# Patient Record
Sex: FEMALE | Race: WHITE | NOT HISPANIC OR LATINO
[De-identification: names, ages, dates, MRNs, and addresses within clinical notes are randomized per-mention and may not be internally consistent; named-entity substitution may affect disease eponyms.]

---

## 2019-11-08 ENCOUNTER — APPOINTMENT (OUTPATIENT)
Dept: UROGYNECOLOGY | Facility: CLINIC | Age: 29
End: 2019-11-08
Payer: COMMERCIAL

## 2019-11-08 VITALS
HEART RATE: 75 BPM | WEIGHT: 165 LBS | HEIGHT: 61 IN | SYSTOLIC BLOOD PRESSURE: 117 MMHG | DIASTOLIC BLOOD PRESSURE: 76 MMHG | BODY MASS INDEX: 31.15 KG/M2

## 2019-11-08 DIAGNOSIS — Z78.9 OTHER SPECIFIED HEALTH STATUS: ICD-10-CM

## 2019-11-08 DIAGNOSIS — Z83.49 FAMILY HISTORY OF OTHER ENDOCRINE, NUTRITIONAL AND METABOLIC DISEASES: ICD-10-CM

## 2019-11-08 DIAGNOSIS — Z83.3 FAMILY HISTORY OF DIABETES MELLITUS: ICD-10-CM

## 2019-11-08 DIAGNOSIS — Z82.49 FAMILY HISTORY OF ISCHEMIC HEART DISEASE AND OTHER DISEASES OF THE CIRCULATORY SYSTEM: ICD-10-CM

## 2019-11-08 DIAGNOSIS — Z87.19 PERSONAL HISTORY OF OTHER DISEASES OF THE DIGESTIVE SYSTEM: ICD-10-CM

## 2019-11-08 DIAGNOSIS — Z83.438 FAMILY HISTORY OF OTHER DISORDER OF LIPOPROTEIN METABOLISM AND OTHER LIPIDEMIA: ICD-10-CM

## 2019-11-08 DIAGNOSIS — M62.89 OTHER SPECIFIED DISORDERS OF MUSCLE: ICD-10-CM

## 2019-11-08 PROBLEM — Z00.00 ENCOUNTER FOR PREVENTIVE HEALTH EXAMINATION: Status: ACTIVE | Noted: 2019-11-08

## 2019-11-08 PROCEDURE — 99204 OFFICE O/P NEW MOD 45 MIN: CPT

## 2019-11-08 RX ORDER — NORETHINDRONE ACETATE AND ETHINYL ESTRADIOL 1.5-30(21)
KIT ORAL
Refills: 0 | Status: ACTIVE | COMMUNITY

## 2019-11-08 RX ORDER — DIAZEPAM 5 MG/1
TABLET ORAL
Refills: 0 | Status: ACTIVE | COMMUNITY

## 2019-11-08 RX ORDER — INFLIXIMAB 100 MG/10ML
INJECTION, POWDER, LYOPHILIZED, FOR SOLUTION INTRAVENOUS
Refills: 0 | Status: ACTIVE | COMMUNITY

## 2019-11-08 RX ORDER — MESALAMINE 1000 MG/1
SUPPOSITORY RECTAL
Refills: 0 | Status: ACTIVE | COMMUNITY

## 2019-11-08 RX ORDER — AMITRIPTYLINE HYDROCHLORIDE 75 MG/1
TABLET, FILM COATED ORAL
Refills: 0 | Status: ACTIVE | COMMUNITY

## 2019-11-08 NOTE — CHIEF COMPLAINT
[Questionnaire Received] : Patient questionnaire received [Sexual Dysfunction] : sexual dysfunction [Painful Urination] : painful urination [Pelvic Pain] : pelvic pain

## 2019-11-08 NOTE — LETTER BODY
[Attached please find my note.] : Attached please find my note. [Thank you very much for allowing me to participate in the care of this patient. If you have any questions, please do not hesitate to contact me] : Thank you very much for allowing me to participate in the care of this patient. If you have any questions, please do not hesitate to contact me. [Dear  ___] : Dear  [unfilled],

## 2019-11-11 RX ORDER — BACLOFEN 5 MG/5ML
5 SOLUTION ORAL
Qty: 473 | Refills: 1 | Status: DISCONTINUED | COMMUNITY
Start: 2019-11-08 | End: 2019-11-11

## 2019-11-15 ENCOUNTER — MESSAGE (OUTPATIENT)
Age: 29
End: 2019-11-15

## 2019-12-17 ENCOUNTER — RESULT REVIEW (OUTPATIENT)
Age: 29
End: 2019-12-17

## 2019-12-17 RX ORDER — BACLOFEN 5 MG/1
5 TABLET ORAL
Qty: 90 | Refills: 0 | Status: ACTIVE | COMMUNITY
Start: 2019-11-11 | End: 1900-01-01

## 2019-12-19 ENCOUNTER — MEDICATION RENEWAL (OUTPATIENT)
Age: 29
End: 2019-12-19

## 2019-12-20 ENCOUNTER — MESSAGE (OUTPATIENT)
Age: 29
End: 2019-12-20

## 2019-12-20 ENCOUNTER — RX RENEWAL (OUTPATIENT)
Age: 29
End: 2019-12-20

## 2019-12-20 ENCOUNTER — MEDICATION RENEWAL (OUTPATIENT)
Age: 29
End: 2019-12-20

## 2020-01-10 ENCOUNTER — OUTPATIENT (OUTPATIENT)
Dept: OUTPATIENT SERVICES | Facility: HOSPITAL | Age: 30
LOS: 1 days | End: 2020-01-10
Payer: COMMERCIAL

## 2020-01-10 ENCOUNTER — APPOINTMENT (OUTPATIENT)
Dept: UROGYNECOLOGY | Facility: CLINIC | Age: 30
End: 2020-01-10
Payer: COMMERCIAL

## 2020-01-10 VITALS
OXYGEN SATURATION: 100 % | HEIGHT: 61 IN | SYSTOLIC BLOOD PRESSURE: 108 MMHG | RESPIRATION RATE: 20 BRPM | DIASTOLIC BLOOD PRESSURE: 76 MMHG | WEIGHT: 166.01 LBS | HEART RATE: 87 BPM | TEMPERATURE: 99 F

## 2020-01-10 DIAGNOSIS — N89.6 TIGHT HYMENAL RING: ICD-10-CM

## 2020-01-10 DIAGNOSIS — Z87.19 PERSONAL HISTORY OF OTHER DISEASES OF THE DIGESTIVE SYSTEM: ICD-10-CM

## 2020-01-10 DIAGNOSIS — N94.2 VAGINISMUS: ICD-10-CM

## 2020-01-10 DIAGNOSIS — N94.19 OTHER SPECIFIED DYSPAREUNIA: ICD-10-CM

## 2020-01-10 DIAGNOSIS — R10.2 PELVIC AND PERINEAL PAIN: ICD-10-CM

## 2020-01-10 DIAGNOSIS — M62.838 OTHER MUSCLE SPASM: ICD-10-CM

## 2020-01-10 DIAGNOSIS — Z87.42 PERSONAL HISTORY OF OTHER DISEASES OF THE FEMALE GENITAL TRACT: ICD-10-CM

## 2020-01-10 DIAGNOSIS — D50.8 OTHER IRON DEFICIENCY ANEMIAS: ICD-10-CM

## 2020-01-10 DIAGNOSIS — Z98.890 OTHER SPECIFIED POSTPROCEDURAL STATES: Chronic | ICD-10-CM

## 2020-01-10 DIAGNOSIS — Z01.818 ENCOUNTER FOR OTHER PREPROCEDURAL EXAMINATION: ICD-10-CM

## 2020-01-10 LAB
ANION GAP SERPL CALC-SCNC: 11 MMOL/L — SIGNIFICANT CHANGE UP (ref 5–17)
BUN SERPL-MCNC: 10 MG/DL — SIGNIFICANT CHANGE UP (ref 7–23)
CALCIUM SERPL-MCNC: 9.4 MG/DL — SIGNIFICANT CHANGE UP (ref 8.4–10.5)
CHLORIDE SERPL-SCNC: 103 MMOL/L — SIGNIFICANT CHANGE UP (ref 96–108)
CO2 SERPL-SCNC: 24 MMOL/L — SIGNIFICANT CHANGE UP (ref 22–31)
CREAT SERPL-MCNC: 0.72 MG/DL — SIGNIFICANT CHANGE UP (ref 0.5–1.3)
GLUCOSE SERPL-MCNC: 119 MG/DL — HIGH (ref 70–99)
HCT VFR BLD CALC: 43.5 % — SIGNIFICANT CHANGE UP (ref 34.5–45)
HGB BLD-MCNC: 14.2 G/DL — SIGNIFICANT CHANGE UP (ref 11.5–15.5)
MCHC RBC-ENTMCNC: 30.1 PG — SIGNIFICANT CHANGE UP (ref 27–34)
MCHC RBC-ENTMCNC: 32.6 GM/DL — SIGNIFICANT CHANGE UP (ref 32–36)
MCV RBC AUTO: 92.2 FL — SIGNIFICANT CHANGE UP (ref 80–100)
PLATELET # BLD AUTO: 325 K/UL — SIGNIFICANT CHANGE UP (ref 150–400)
POTASSIUM SERPL-MCNC: 3.8 MMOL/L — SIGNIFICANT CHANGE UP (ref 3.5–5.3)
POTASSIUM SERPL-SCNC: 3.8 MMOL/L — SIGNIFICANT CHANGE UP (ref 3.5–5.3)
RBC # BLD: 4.72 M/UL — SIGNIFICANT CHANGE UP (ref 3.8–5.2)
RBC # FLD: 12.4 % — SIGNIFICANT CHANGE UP (ref 10.3–14.5)
SODIUM SERPL-SCNC: 138 MMOL/L — SIGNIFICANT CHANGE UP (ref 135–145)
WBC # BLD: 8.61 K/UL — SIGNIFICANT CHANGE UP (ref 3.8–10.5)
WBC # FLD AUTO: 8.61 K/UL — SIGNIFICANT CHANGE UP (ref 3.8–10.5)

## 2020-01-10 PROCEDURE — 99214 OFFICE O/P EST MOD 30 MIN: CPT

## 2020-01-10 PROCEDURE — 85027 COMPLETE CBC AUTOMATED: CPT

## 2020-01-10 PROCEDURE — G0463: CPT

## 2020-01-10 PROCEDURE — 80048 BASIC METABOLIC PNL TOTAL CA: CPT

## 2020-01-10 RX ORDER — SODIUM CHLORIDE 9 MG/ML
3 INJECTION INTRAMUSCULAR; INTRAVENOUS; SUBCUTANEOUS EVERY 8 HOURS
Refills: 0 | Status: DISCONTINUED | OUTPATIENT
Start: 2020-01-22 | End: 2020-02-06

## 2020-01-10 RX ORDER — CEFAZOLIN SODIUM 1 G
2000 VIAL (EA) INJECTION ONCE
Refills: 0 | Status: DISCONTINUED | OUTPATIENT
Start: 2020-01-22 | End: 2020-02-06

## 2020-01-10 RX ORDER — LIDOCAINE HCL 20 MG/ML
0.2 VIAL (ML) INJECTION ONCE
Refills: 0 | Status: DISCONTINUED | OUTPATIENT
Start: 2020-01-22 | End: 2020-02-06

## 2020-01-10 NOTE — H&P PST ADULT - NSICDXPASTMEDICALHX_GEN_ALL_CORE_FT
PAST MEDICAL HISTORY:  H/O female dyspareunia     H/O ulcerative colitis     Irritable bowel syndrome (IBS)     Other iron deficiency anemia

## 2020-01-10 NOTE — H&P PST ADULT - NSICDXFAMILYHX_GEN_ALL_CORE_FT
FAMILY HISTORY:  Family hx of hypertension, mother age 65 alive  FHx: diabetes mellitus, father age 59 alive

## 2020-01-10 NOTE — H&P PST ADULT - HISTORY OF PRESENT ILLNESS
29 year old female with h/o dyspareunia/ tight hymenal ring is scheduled on 1/22/2020 for dilation of vagina under anesthesia, injection of anesthetic agent into pudendal nerve, partial hymenectomy, cystoscopy, also with h/o ulcerative colitis, IBS-D.  Patient had straight cath urine collected at Dr Pak office 1/10/2020.

## 2020-01-10 NOTE — H&P PST ADULT - NSICDXPROBLEM_GEN_ALL_CORE_FT
PROBLEM DIAGNOSES  Problem: H/O female dyspareunia  Assessment and Plan: scheduled dilation of vagina under anesthesia, injection of anesthic agent into pudendal nerve, partial hymenectomy, trigger point injection, cystoscopy.  preop instruction given to both patient and her significant other, both verbalized understanding   urine cup given for patient to bring urine sample for pregnancy DOS    Problem: H/O ulcerative colitis  Assessment and Plan: patient will continue her medication michael-op    Problem: Other iron deficiency anemia  Assessment and Plan:

## 2020-01-10 NOTE — H&P PST ADULT - NSANTHOSAYNRD_GEN_A_CORE
No. MATRITA screening performed.  STOP BANG Legend: 0-2 = LOW Risk; 3-4 = INTERMEDIATE Risk; 5-8 = HIGH Risk

## 2020-01-13 ENCOUNTER — RESULT REVIEW (OUTPATIENT)
Age: 30
End: 2020-01-13

## 2020-01-13 LAB — BACTERIA UR CULT: NORMAL

## 2020-01-21 ENCOUNTER — TRANSCRIPTION ENCOUNTER (OUTPATIENT)
Age: 30
End: 2020-01-21

## 2020-01-21 NOTE — HISTORY OF PRESENT ILLNESS
[] : years ago [Cystocele (Obstetric)] : daily [Rectal Prolapse] : none [FreeTextEntry1] : \par 30yo G0 with pelvic floor dysfunction and pelvic. She has tried pelvic floor PT, vaginal dilators and is currently on Amitriptyline and Diazepam. She always had painful intercourse and was diagnosed vulvodynia. PFT and vaginal dilators for a year. She reports these interventions helped somewhat, however, she continued to have difficulty with intercourse. She just started Ambidextrin and had extremely minimal improvement.\par \par She has a teen history for a trampoline injury to coccyx - was not able to walk for several hours and had weeks to months of pain. \par \par Sex only with her  - always uncomfortable - barely can have penetration and feels severe burning and tensioning of muscles. Able to enjoy external stimulation, oral stimulation, anal sex. \par \par PMH: Ulcerative colitis\par \par \par Review of system negative except as indicated on review of systems\par \par \par Constitutional:  No acute distress, well developed well nourished good hygiene\par \par Neuro/psych:  Orientated x2,   normal memory\par \par Respiratory:   no cough, no dypsnea\par \par Neck:    Normal appearance,   symmetrical\par \par Abdomen: No masses,   tenderness,  distension,  hernia\par \par Occult blood: Not performed\par \par Skin warm and dry,  No rash,  No lesions\par \par Musculoskeletal: l Normal gait, No involuntary movements\par \par External Genitalia: normal.  I do not appreciate vulvodynia - she has guarding in anticipation of internal exam. \par \par Labia/Clitoris: Labia normal, clitoris normal\par \par Urethra: normal\par \par Vagina: severe stenosis with global severe muscle spasm 5/5. The pelvic exam is significant for vaginal constriction there is marked at muscle spasm bilateral. There is spasm & tightness of the iliococcygeus muscle, the pubococcygeus muscle, the puborectalis, and piriformis muscles bilaterally.  Myofascial pain is elicited easily and is moderate to severe with examination or palpation of each muscle group. The spasm id greater on the left than the right.  There is tight banding on the left which has focal pain. There is spasm response to palpation or 'rolling' of the examining finger in this region and over most of the taught muscles.   These finding represent severe myofascial spasm, pain, and trigger points are noted at each location.   It is my impression that muscle spasm accounts for this patients pain / dyspareunia.  These findings commonly cause or exacerbate pelvic pain, vaginal pain, dyspareunia, vaginismus, voiding function, and pelvic floor dysfunction.\par \par Bladder: No tenderness/masses\par \par Vaginal Atrophy:  mild\par \par Cervix & Uterus: cervix palpable normal - limited exam digital only\par \par Adnexa: No abnormality\par \par Rectal Mucosa: normal\par \par Abdomen: No mass, tenderness, hernia\par \par \par Prolapse Assessment: no evidence of prolapse. \par \par Levators: 5/5 global with pain. \par \par \par A/P:\par \par This hamlet 29-year-old has pelvic floor dysfunction manifest with vaginismus. She had an initial coccyx injury as a teen and has never been able to have comfortable sexual relations. She is able to enjoy self stimulation oral stimulation and anal 6 anything in the vagina is met with significant pain. The vagina is stenotic with 5 out of 5 for muscular spasm globally she has worked with physical therapist and multiple dilators and has minimal to no progress. She has been on antidepressant for pain which gives her a trace relief of the vulva I do not think she has vulvodynia.\par \par I have counseled her regarding the spectrum of the anatomy and treatment plans for pelvic muscle spasm recommendations. She'll begin hot baths heating pads direct massage relaxation techniques foam roller. I do not think to benefit from pelvic floor physical therapy as she is presently too reactive and intolerant of touch.  She will begin a liquid form of baclofen ( not good with tablets) for muscle relaxation and vaginal diazepam. The off label risks of vaginal diazepam were reviewed and the sedating effect of each medication were reviewed. She will start was one and then had the other and gradually increasing doses. He scheduled for baclofen was given to her. The vaginal diazepam will start with 5 mg daily increasing to 10 mg daily.\par \par If the liquid form of baclofen is not covered which is highly likely and we will switch her to oral tablets her she will have to crush and mixed with liquid and her foods as she cannot swallow tablets.\par \par She will call to update me in 2 weeks on her progress. She will abstain from sexual intercourse in the interim. She will practice dilating only with her finger one finger then 2 fingers as she is inserting the vaginal diazepam.\par \par \par I did discuss with her vaginal dilation under anesthesia with the without the use of Botox and ultimately do think she would benefit from that procedure.\par \par Counselingis greater than 50% of this encounter which was 65 minutes face to face. \par \par \par \par \par

## 2020-01-22 ENCOUNTER — APPOINTMENT (OUTPATIENT)
Dept: UROGYNECOLOGY | Facility: HOSPITAL | Age: 30
End: 2020-01-22

## 2020-01-22 ENCOUNTER — OUTPATIENT (OUTPATIENT)
Dept: OUTPATIENT SERVICES | Facility: HOSPITAL | Age: 30
LOS: 1 days | Discharge: HOME | End: 2020-01-22
Payer: COMMERCIAL

## 2020-01-22 VITALS
HEART RATE: 86 BPM | OXYGEN SATURATION: 99 % | DIASTOLIC BLOOD PRESSURE: 63 MMHG | RESPIRATION RATE: 16 BRPM | TEMPERATURE: 99 F | SYSTOLIC BLOOD PRESSURE: 111 MMHG

## 2020-01-22 VITALS
DIASTOLIC BLOOD PRESSURE: 78 MMHG | SYSTOLIC BLOOD PRESSURE: 113 MMHG | TEMPERATURE: 97 F | RESPIRATION RATE: 18 BRPM | HEART RATE: 76 BPM | HEIGHT: 61 IN | OXYGEN SATURATION: 100 % | WEIGHT: 166.01 LBS

## 2020-01-22 DIAGNOSIS — Z98.890 OTHER SPECIFIED POSTPROCEDURAL STATES: Chronic | ICD-10-CM

## 2020-01-22 DIAGNOSIS — N94.19 OTHER SPECIFIED DYSPAREUNIA: ICD-10-CM

## 2020-01-22 DIAGNOSIS — N89.6 TIGHT HYMENAL RING: ICD-10-CM

## 2020-01-22 DIAGNOSIS — M62.838 OTHER MUSCLE SPASM: ICD-10-CM

## 2020-01-22 DIAGNOSIS — R10.2 PELVIC AND PERINEAL PAIN: ICD-10-CM

## 2020-01-22 DIAGNOSIS — N94.2 VAGINISMUS: ICD-10-CM

## 2020-01-22 PROCEDURE — 57400 DILATION OF VAGINA: CPT

## 2020-01-22 PROCEDURE — 56700 PRTL HYMNCTMY/REVJ HYMNL RNG: CPT

## 2020-01-22 PROCEDURE — 53660 DILATION OF URETHRA: CPT

## 2020-01-22 PROCEDURE — J0585H: CUSTOM

## 2020-01-22 PROCEDURE — 20552 NJX 1/MLT TRIGGER POINT 1/2: CPT | Mod: XS

## 2020-01-22 PROCEDURE — 20553 NJX 1/MLT TRIGGER POINTS 3/>: CPT

## 2020-01-22 PROCEDURE — 64646 CHEMODENERV TRUNK MUSC 1-5: CPT

## 2020-01-22 RX ORDER — SODIUM CHLORIDE 9 MG/ML
1000 INJECTION, SOLUTION INTRAVENOUS
Refills: 0 | Status: DISCONTINUED | OUTPATIENT
Start: 2020-01-22 | End: 2020-02-06

## 2020-01-22 RX ORDER — OXYCODONE HYDROCHLORIDE 5 MG/1
5 TABLET ORAL
Qty: 50 | Refills: 0
Start: 2020-01-22 | End: 2020-01-24

## 2020-01-22 RX ORDER — FAMOTIDINE 10 MG/ML
20 INJECTION INTRAVENOUS ONCE
Refills: 0 | Status: COMPLETED | OUTPATIENT
Start: 2020-01-22 | End: 2020-01-22

## 2020-01-22 RX ORDER — ACETAMINOPHEN 500 MG
20.3 TABLET ORAL
Qty: 203 | Refills: 0
Start: 2020-01-22 | End: 2020-01-24

## 2020-01-22 RX ORDER — LIDOCAINE 4 G/100G
5 CREAM TOPICAL
Qty: 100 | Refills: 0
Start: 2020-01-22 | End: 2020-01-28

## 2020-01-22 RX ORDER — ONDANSETRON 8 MG/1
4 TABLET, FILM COATED ORAL ONCE
Refills: 0 | Status: DISCONTINUED | OUTPATIENT
Start: 2020-01-22 | End: 2020-02-06

## 2020-01-22 RX ORDER — OXYCODONE HYDROCHLORIDE 5 MG/1
1 TABLET ORAL
Qty: 10 | Refills: 0
Start: 2020-01-22 | End: 2020-01-24

## 2020-01-22 RX ORDER — ACETAMINOPHEN 500 MG
2 TABLET ORAL
Qty: 0 | Refills: 0 | DISCHARGE

## 2020-01-22 RX ORDER — OXYCODONE HYDROCHLORIDE 5 MG/1
5 TABLET ORAL ONCE
Refills: 0 | Status: DISCONTINUED | OUTPATIENT
Start: 2020-01-22 | End: 2020-01-22

## 2020-01-22 NOTE — BRIEF OPERATIVE NOTE - NSICDXBRIEFPREOP_GEN_ALL_CORE_FT
PRE-OP DIAGNOSIS:  Vaginismus 22-Jan-2020 15:39:10  Dayanna Kenny  Urethral meatal stenosis 22-Jan-2020 15:39:00  Dayanna Kenny  Tight hymenal ring 22-Jan-2020 15:38:53  Dayanna Kenny

## 2020-01-22 NOTE — PRE-ANESTHESIA EVALUATION ADULT - NSANTHOSAYNRD_GEN_A_CORE
No. MARTITA screening performed.  STOP BANG Legend: 0-2 = LOW Risk; 3-4 = INTERMEDIATE Risk; 5-8 = HIGH Risk

## 2020-01-22 NOTE — ASU PATIENT PROFILE, ADULT - VISION (WITH CORRECTIVE LENSES IF THE PATIENT USUALLY WEARS THEM):
glasses glasses/Normal vision: sees adequately in most situations; can see medication labels, newsprint

## 2020-01-22 NOTE — ASU PATIENT PROFILE, ADULT - PMH
H/O female dyspareunia    H/O ulcerative colitis    Irritable bowel syndrome (IBS)    Other iron deficiency anemia

## 2020-01-22 NOTE — BRIEF OPERATIVE NOTE - NSICDXBRIEFPROCEDURE_GEN_ALL_CORE_FT
PROCEDURES:  Female urethral dilation 22-Jan-2020 15:38:29  Dayanna Kenny  Trigger point injection of 3 muscles 22-Jan-2020 15:38:21  Dayanna Kenny  Pudendal nerve block 22-Jan-2020 15:37:46  Dayanna Kenny  Hymenotomy 22-Jan-2020 15:37:39  Dayanna Kenny

## 2020-01-22 NOTE — ASU DISCHARGE PLAN (ADULT/PEDIATRIC) - CALL YOUR DOCTOR IF YOU HAVE ANY OF THE FOLLOWING:
Increased irritability or sluggishness/Nausea and vomiting that does not stop/Inability to tolerate liquids or foods/Wound/Surgical Site with redness, or foul smelling discharge or pus/Numbness, tingling, color or temperature change to extremity/Fever greater than (need to indicate Fahrenheit or Celsius)/Bleeding that does not stop/Unable to urinate/Excessive diarrhea/Swelling that gets worse/Pain not relieved by Medications

## 2020-01-22 NOTE — BRIEF OPERATIVE NOTE - NSICDXBRIEFPOSTOP_GEN_ALL_CORE_FT
POST-OP DIAGNOSIS:  Vaginismus 22-Jan-2020 15:39:39  Dayanna Kenny  Urethral meatal stenosis 22-Jan-2020 15:39:29  Dayanna Kenny  Tight hymenal ring 22-Jan-2020 15:39:22  Dayanna Kenny

## 2020-01-22 NOTE — ASU DISCHARGE PLAN (ADULT/PEDIATRIC) - CARE PROVIDER_API CALL
Jayden Pak (MD)  Female Pelvic MedReconst Surg; Obstetrics and Gynecology  [address]  [place] [address]  Phone: [phone]  Fax: [phone]  Follow Up Time:

## 2020-01-22 NOTE — BRIEF OPERATIVE NOTE - OPERATION/FINDINGS
Narrow introitus with intact hymen, urethral meatal stenosis.  Pelvic floor muscle hypertrophy Following dilation urethral normal caliber and vagina able to accommodate largest glass vaginal dilator.

## 2020-01-23 ENCOUNTER — APPOINTMENT (OUTPATIENT)
Dept: UROGYNECOLOGY | Facility: CLINIC | Age: 30
End: 2020-01-23
Payer: COMMERCIAL

## 2020-01-23 PROBLEM — K58.9 IRRITABLE BOWEL SYNDROME WITHOUT DIARRHEA: Chronic | Status: ACTIVE | Noted: 2020-01-10

## 2020-01-23 PROBLEM — Z87.19 PERSONAL HISTORY OF OTHER DISEASES OF THE DIGESTIVE SYSTEM: Chronic | Status: ACTIVE | Noted: 2020-01-10

## 2020-01-23 PROBLEM — D50.8 OTHER IRON DEFICIENCY ANEMIAS: Chronic | Status: ACTIVE | Noted: 2020-01-10

## 2020-01-23 PROBLEM — Z87.42 PERSONAL HISTORY OF OTHER DISEASES OF THE FEMALE GENITAL TRACT: Chronic | Status: ACTIVE | Noted: 2020-01-10

## 2020-01-23 PROCEDURE — 99024 POSTOP FOLLOW-UP VISIT: CPT

## 2020-01-27 NOTE — END OF VISIT
[FreeTextEntry3] : I have discussed the case with practitioner and directly  supervised face to face the key elements of the encounter\par

## 2020-01-27 NOTE — OBJECTIVE
[FreeTextEntry3] : able to insert medium glass dilator at visit, with no difficulty or pain.  Large glass dilator inserted alf but pt reported soreness with advancement.  PFM's relaxed.

## 2020-01-27 NOTE — SUBJECTIVE
[FreeTextEntry1] : ok [FreeTextEntry7] : relieved with oxycodone [FreeTextEntry6] : normal [FreeTextEntry5] : denies any retention, frequency or incontinence [FreeTextEntry4] : s [FreeTextEntry3] : normal [FreeTextEntry2] : normal

## 2020-01-27 NOTE — DISCUSSION/SUMMARY
[FreeTextEntry1] : Pt doing well after procedure. She was able to sleep with dilator in place since yesterday.    She reported some soreness which was relieved with oxycodone.  Pt is not able to take NSAIDS.  She also has liquid Tylenol.  Pt advised to continue using medium glass dilator or Large sy[place]use dilator for 1-2 hrs daily.   She is aware that the main source of discomfort would be due to hymenotomy. Pt is very happy with her progress. She will RTO in 2 weeks or sooner if needed. Pt agrees to call office with any problems/concerns.

## 2020-02-04 RX ORDER — LIDOCAINE 5 G/100G
5 OINTMENT TOPICAL
Qty: 1 | Refills: 3 | Status: DISCONTINUED | COMMUNITY
Start: 2020-02-03 | End: 2020-02-04

## 2020-02-04 RX ORDER — OXYCODONE 5 MG/1
5 TABLET ORAL
Qty: 30 | Refills: 0 | Status: DISCONTINUED | COMMUNITY
Start: 2020-02-03 | End: 2020-02-04

## 2020-02-04 RX ORDER — OXYCODONE HYDROCHLORIDE 5 MG/5ML
5 SOLUTION ORAL
Qty: 200 | Refills: 0 | Status: ACTIVE | COMMUNITY
Start: 2020-02-04 | End: 1900-01-01

## 2020-02-10 ENCOUNTER — APPOINTMENT (OUTPATIENT)
Dept: UROGYNECOLOGY | Facility: CLINIC | Age: 30
End: 2020-02-10
Payer: COMMERCIAL

## 2020-02-10 DIAGNOSIS — N94.9 UNSPECIFIED CONDITION ASSOCIATED WITH FEMALE GENITAL ORGANS AND MENSTRUAL CYCLE: ICD-10-CM

## 2020-02-10 LAB
BILIRUB UR QL STRIP: NORMAL
CLARITY UR: CLEAR
COLLECTION METHOD: NORMAL
GLUCOSE UR-MCNC: NORMAL
HCG UR QL: 0.2 EU/DL
HGB UR QL STRIP.AUTO: NORMAL
KETONES UR-MCNC: NORMAL
LEUKOCYTE ESTERASE UR QL STRIP: NORMAL
NITRITE UR QL STRIP: NORMAL
PH UR STRIP: 6
PROT UR STRIP-MCNC: NORMAL
SP GR UR STRIP: 1.01

## 2020-02-10 PROCEDURE — 99024 POSTOP FOLLOW-UP VISIT: CPT

## 2020-02-10 PROCEDURE — 99213 OFFICE O/P EST LOW 20 MIN: CPT

## 2020-02-10 RX ORDER — ACETAMINOPHEN 160 MG/5ML
160 LIQUID ORAL
Qty: 1 | Refills: 0 | Status: ACTIVE | COMMUNITY
Start: 2020-02-10 | End: 1900-01-01

## 2020-02-10 NOTE — OBJECTIVE
[Soft and Nontender] : soft and nontender [Good Support] : Good support [Clean, Dry, Intact] : Clean, Dry, Intact [Healing well] : healing well [No Masses or Tenderness] : no masses or tenderness [FreeTextEntry3] : noticed some banding upon entry but once passed, she is more comfortable.

## 2020-02-10 NOTE — DISCUSSION/SUMMARY
[Post-Op instructions given. Pt/family verbalizes understanding] : post-operative instructions were provided to the patient/family who verbalize understanding [Risks/Benefits discussed. Pt/family verbalizes understanding] : risks and benefits of the procedure were discussed with the patient/family who verbalize understanding [FreeTextEntry1] : Dr. Pak discussed with pt to only stick with the glass medium dilator for now and allow the hymenotomy to heal.  She will continue the regimen and not advance to the Large dilator.  Boyfriend will help to connect with pt other than the therapy portion.  \par She will return in two weeks to see if able to advance to the large dilator and area is healed doing better.  IF pt have any problems/concern to call office.  PT and boyfriend aware and agrees.

## 2020-02-10 NOTE — SUBJECTIVE
[FreeTextEntry1] : PT is healing since surgery.  Been using the medium glass dilator for 1/2 hour and then the [place] dilator Large 1 /12 hour.  She's been having the burning sensation upon entry but once she advances the dilator, is fine.  She feels some burning after urination as well.  She is unsure if it's her incisions that she feels this burning.  Stated she has had this prior to surgery.  [FreeTextEntry8] : Oxycodone as needed but have not been using it.  Tylenol as needed.  Lidocaine. [FreeTextEntry7] : Burning and discomfort upon entry otherwise she is much better.  In the past pain, was much higher, she is now 6/10. [FreeTextEntry6] : good [FreeTextEntry5] : unsure if she has an infection.  Was never test by her GYN.  Urine dip today was negative. [FreeTextEntry2] : good [FreeTextEntry4] : good [FreeTextEntry3] : normal and steady

## 2020-02-24 ENCOUNTER — APPOINTMENT (OUTPATIENT)
Dept: UROGYNECOLOGY | Facility: CLINIC | Age: 30
End: 2020-02-24
Payer: COMMERCIAL

## 2020-02-24 DIAGNOSIS — Z98.890 OTHER SPECIFIED POSTPROCEDURAL STATES: ICD-10-CM

## 2020-02-24 PROCEDURE — 99212 OFFICE O/P EST SF 10 MIN: CPT

## 2020-02-24 NOTE — DISCUSSION/SUMMARY
[Risks/Benefits discussed. Pt/family verbalizes understanding] : risks and benefits of the procedure were discussed with the patient/family who verbalize understanding [Post-Op instructions given. Pt/family verbalizes understanding] : post-operative instructions were provided to the patient/family who verbalize understanding [FreeTextEntry1] : Pt to only stick with the glass medium dilator for now and apply good amount of lubrication to help glide dilator in.  She will continue the regimen and not advance to the large dilator when she is ready.  She will call office to provide progress.   will help to connect with pt other than the therapy portion.  Pt is more comfortable with anatomy and with touch.\par She will return in 4 weeks to see if able to advance to the large dilator and area is doing better.  IF pt have any problems/concern to call office.  PT and  aware and agrees.

## 2020-02-24 NOTE — OBJECTIVE
[Clean, Dry, Intact] : Clean, Dry, Intact [Soft and Nontender] : soft and nontender [Good Support] : Good support [No Masses or Tenderness] : no masses or tenderness [Healing well] : healing well [FreeTextEntry3] : Noticed some mild erythematous irritation of the left labial minora.  Pt able to insert Medium glass dilator with breathing techniques more comfortably.   was able to help with process.

## 2020-02-24 NOTE — SUBJECTIVE
[FreeTextEntry1] : PT is healing since surgery.  Been using the small glass dilator pushing down on the fourchette and she's been doing well with it.  She had tried to go back on the medium glass dilator and feels a raw, burning sensation now on the left side.  Once she's passed the area, she is fine having the dilator inside. [FreeTextEntry8] : No longer using the the Oxycodone or Tylenol.  She is using the Lidocaine. [FreeTextEntry7] : Some burning along the labial on the left. [FreeTextEntry6] : good [FreeTextEntry5] : urinating fine. [FreeTextEntry4] : good [FreeTextEntry3] : normal and steady [FreeTextEntry2] : good

## 2020-03-30 ENCOUNTER — APPOINTMENT (OUTPATIENT)
Dept: UROGYNECOLOGY | Facility: CLINIC | Age: 30
End: 2020-03-30

## 2020-09-22 ENCOUNTER — RX RENEWAL (OUTPATIENT)
Age: 30
End: 2020-09-22

## 2020-09-25 ENCOUNTER — RX RENEWAL (OUTPATIENT)
Age: 30
End: 2020-09-25

## 2020-09-25 RX ORDER — LIDOCAINE HYDROCHLORIDE 20 MG/ML
2 SOLUTION ORAL; TOPICAL
Qty: 100 | Refills: 0 | Status: ACTIVE | COMMUNITY
Start: 2020-02-04 | End: 1900-01-01

## 2021-02-04 ENCOUNTER — NON-APPOINTMENT (OUTPATIENT)
Age: 31
End: 2021-02-04

## 2021-02-05 ENCOUNTER — TRANSCRIPTION ENCOUNTER (OUTPATIENT)
Age: 31
End: 2021-02-05

## 2021-02-05 ENCOUNTER — APPOINTMENT (OUTPATIENT)
Dept: OTOLARYNGOLOGY | Facility: CLINIC | Age: 31
End: 2021-02-05
Payer: COMMERCIAL

## 2021-02-05 VITALS — BODY MASS INDEX: 31.72 KG/M2 | HEIGHT: 61 IN | TEMPERATURE: 98.9 F | WEIGHT: 168 LBS

## 2021-02-05 DIAGNOSIS — J31.0 CHRONIC RHINITIS: ICD-10-CM

## 2021-02-05 DIAGNOSIS — R43.1 PAROSMIA: ICD-10-CM

## 2021-02-05 DIAGNOSIS — Z86.19 PERSONAL HISTORY OF OTHER INFECTIOUS AND PARASITIC DISEASES: ICD-10-CM

## 2021-02-05 DIAGNOSIS — M26.609 UNSPECIFIED TEMPOROMANDIBULAR JOINT DISORDER: ICD-10-CM

## 2021-02-05 DIAGNOSIS — R43.8 OTHER DISTURBANCES OF SMELL AND TASTE: ICD-10-CM

## 2021-02-05 DIAGNOSIS — R43.2 PARAGEUSIA: ICD-10-CM

## 2021-02-05 DIAGNOSIS — J34.2 DEVIATED NASAL SEPTUM: ICD-10-CM

## 2021-02-05 PROCEDURE — 31231 NASAL ENDOSCOPY DX: CPT

## 2021-02-05 PROCEDURE — 99072 ADDL SUPL MATRL&STAF TM PHE: CPT

## 2021-02-05 PROCEDURE — 99244 OFF/OP CNSLTJ NEW/EST MOD 40: CPT | Mod: 25

## 2021-02-05 PROCEDURE — 92512 NASAL FUNCTION STUDIES: CPT

## 2021-02-05 PROCEDURE — 99204 OFFICE O/P NEW MOD 45 MIN: CPT

## 2021-02-05 RX ORDER — IVERMECTIN 3 MG/1
3 TABLET ORAL
Qty: 16 | Refills: 0 | Status: ACTIVE | COMMUNITY
Start: 2021-02-05 | End: 1900-01-01

## 2021-02-05 NOTE — REVIEW OF SYSTEMS
[Nasal Congestion] : nasal congestion [Nose Bleeds] : nose bleeds [Sense Of Smell Problem] : sense of smell problem [Negative] : Heme/Lymph

## 2021-03-02 ENCOUNTER — RX RENEWAL (OUTPATIENT)
Age: 31
End: 2021-03-02

## 2021-05-18 ENCOUNTER — APPOINTMENT (OUTPATIENT)
Dept: UROGYNECOLOGY | Facility: CLINIC | Age: 31
End: 2021-05-18
Payer: COMMERCIAL

## 2021-05-18 VITALS — DIASTOLIC BLOOD PRESSURE: 72 MMHG | HEART RATE: 64 BPM | SYSTOLIC BLOOD PRESSURE: 115 MMHG | TEMPERATURE: 98.5 F

## 2021-05-18 PROCEDURE — 99072 ADDL SUPL MATRL&STAF TM PHE: CPT

## 2021-05-18 PROCEDURE — 99214 OFFICE O/P EST MOD 30 MIN: CPT

## 2021-05-18 NOTE — HISTORY OF PRESENT ILLNESS
[FreeTextEntry1] : This 30-year-old woman has vaginismus, and she is status post-vaginal dilation under anesthesia. Trigger point injection hymenotomy and Botox injection January 2020.  She is very happy with improvement in her words, 80-90%, where she can have penetration she can have intercourse which is tolerated, with only mild discomfort for 5 minutes, but she has problem in that her  requires mood stabilizers g erectile dysfunction and delayed ejaculation.  She therefore is having sex 3 times per week. I, think she's having any pain contraction vicious cycle whereby she's increasing her pain with prolonged sex and shaving the tissues which are narrow. At the introitus..  \par \par On examination she tolerates 2 and 3 finger exam, which was never able to do previously.  The introitus is slightly erythematous consistent with a long sexual activity.\par \par I have suggested that she and her  discussed with her and anxiety can be treated with a benzo class of drugs, rather than any serotonin inhibitor ejaculation will not be delayed.  I also suggested attempting Azactam suppositories 5 mg to 10 mg, and she's going to let us know if [facility] in [place] which is a compound  is able to do this type of prescription and call us.\par \par She would like to repeat vaginal dilation under anesthesia, possibly without Botox financial feasibility being the limitation on the Botox.  Her progress has been quite excellent. However, she still cannot  have comfortable, sexual intercourse. \par \par We will wrk on scheduling dilation procedure and await feedback on where to send diazepam suppoistories\par \par A chaperone was present for the entirety of the physical examination and for the in exam room portion of patient interview\par Counseling was greater than 50 percent of encounter which included  36   minute face to face time for direct counseling.\par

## 2021-05-19 RX ORDER — DIAZEPAM 100 %
POWDER (GRAM) MISCELLANEOUS
Qty: 30 | Refills: 0 | Status: ACTIVE | COMMUNITY
Start: 2021-05-19 | End: 1900-01-01

## 2021-05-28 ENCOUNTER — APPOINTMENT (OUTPATIENT)
Dept: OTOLARYNGOLOGY | Facility: CLINIC | Age: 31
End: 2021-05-28
Payer: COMMERCIAL

## 2021-05-28 PROCEDURE — 92512 NASAL FUNCTION STUDIES: CPT

## 2021-05-28 PROCEDURE — 99072 ADDL SUPL MATRL&STAF TM PHE: CPT

## 2021-05-28 PROCEDURE — 99213 OFFICE O/P EST LOW 20 MIN: CPT | Mod: 25

## 2021-05-28 RX ORDER — DIAZEPAM 100 %
POWDER (GRAM) MISCELLANEOUS
Qty: 30 | Refills: 0 | Status: DISCONTINUED | COMMUNITY
Start: 2019-11-08 | End: 2021-05-28

## 2021-05-28 NOTE — ASSESSMENT
[FreeTextEntry1] : Nasal endoscopy: \par \par Endoscopy was done with Covid precautions and with video. All risks and benefits were discussed with the patient and consent obtained.\par \par Procedure Note:\par \par Nasal endoscopy was done with topical anesthesia and a fiberoptic endoscope.\par Indication: Nasal congestion, rule out sinusitis.\par Procedure: The nasal cavity was anesthetized with topical Afrin and Pontocaine. An  endoscope was used and inserted into the nasal cavity. \par Endocoscopy was performed to inspect the interior of the nasal cavity, the nasal septum,  the middle and superior meati, the inferior, middle and superior turbinates, and the spheno-ethmoidal  recesses, the nasopharynx and eustachian tube orifices bilaterally\par  This showed that the nasal mucosa was slightly boggy.  There was a moderate S-shaped septal deflection.  However, the middle meati were open.  There were no polyps, purulence or masses.  The eustachian tube orifices were clear.  The nasopharynx was benign and without mass.  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  I explained the physiology. The rationale and success rate of smell therapy was discussed. She had not been really doing it as she felt that she could not smell but I felt that she should continue to do this regularly and reviewed this with her. I also suggested a trial of fluticasone.\par I then also reviewed other options with her, and neither has been proven. First the use of theophylline as. from the [facility].  This was prescribed at the level so recommended as a nasal spray.  The results that I have seen, are now we are nearing the published responses by Dr. King.  I also reviewed an article with her that came out last week from [facility] recommending 2 days of ivermectin twice.  I explained that I am quite skeptical that this will help but she wanted to go ahead and try and risks and benefits were discussed at length.\par I asked her to call me at the end of the week and let me know if she has had any response but otherwise continue on the others and would like to reevaluate in 3 months.\par \par Her issues with the nasal saline were related to her septal deflection which is not otherwise causing problems and I therefore also did not use steroid rinses for her

## 2021-05-28 NOTE — CONSULT LETTER
[Consult Letter:] : I had the pleasure of evaluating your patient, [unfilled]. [FreeTextEntry2] : Peter Goldsmith DO\par [phone] [FreeTextEntry1] : \par \par Dear  Dr. Mitchell\par \par I had the pleasure of seeing your patient today.  \par Please see my note below.\par \par \par Thank you very much for allowing me to participate in the care of your patient.\par \par Sincerely,\par \par \par Tomasz Guerrero MD\par [facility]\par [facility]\par  [facility]\par \par

## 2021-05-28 NOTE — PHYSICAL EXAM
[FreeTextEntry1] : \par The patient was alert and oriented and in no distress.\par Voice was clear.\par \par Face:\par The patient had no facial asymmetry or mass.\par The skin was unremarkable.\par \par Eyes:\par The pupils were equal round and reactive to light and accommodation.\par There was no significant nystagmus or disconjugate gaze noted.\par \par Nose: \par The external nose had no significant deformity.  There was no facial tenderness.  On anterior rhinoscopy, the nasal mucosa was clear.  The anterior septum was midline.  There were no visualized polyps purulence  or masses.\par \par Oral cavity:\par The oral mucosa was normal.\par The oral and base of tongue were clear and without mass.\par The gingival and buccal mucosa were moist and without lesions.\par The palate moved well.\par There was no cleft to the palate.\par There appeared to be good salivary flow.  \par There was no pus, erythema or mass in the oral cavity.\par \par \par Ears:\par The external ears were normal without deformity.\par The ear canals were clear.\par The tympanic membranes were intact and normal.\par \par Neck: \par The neck was symmetrical.\par The parotid and submandibular glands were normal without masses.\par The trachea was midline and there was no unusual crepitus.\par The thyroid was smooth and nontender and no masses were palpated.\par There was no significant cervical adenopathy.\par \par \par Neuro:\par Neurologically, the patient was awake, alert, and oriented to person, place and time. There were no obvious focal neurologic abnormalities.  Cranial nerves II through XII were grossly intact.\par \par \par TMJ:\par The temporomandibular joints were nontender.\par There was no abnormal crepitus and no significant malocclusion\par \par [facility] Testing was done. The patient had    6 out over 12 correct responses which puts the patient in the      lowest      percentile for age and sex

## 2021-05-28 NOTE — CONSULT LETTER
[Consult Letter:] : I had the pleasure of evaluating your patient, [unfilled]. [FreeTextEntry2] : Peter Goldsmith DO\par [phone] [FreeTextEntry1] : \par \par Dear  Dr. Mitchell\par \par I had the pleasure of seeing your patient today.  \par Please see my note below.\par \par \par Thank you very much for allowing me to participate in the care of your patient.\par \par Sincerely,\par \par \par Tomasz Guerrero MD\par [facility]\par [facility]\par  [facility]\par \par

## 2021-05-28 NOTE — HISTORY OF PRESENT ILLNESS
[de-identified] : NICOLAS TY is a 30 year old female who comes in complaining of Problems with her sense of smell and taste. She had a cold and 19 infection last April and with this she lost her sense of smell. She did not notice any problems with her sense of taste. However, in September she felt that there was some improvement or return of her sense of smell but that much of what she smells of stool answered. Her case became distorted. She tried in any part but is burned and the nasal rinses did not come out readily. This made her more congested. She does have a history of alternating congestion and Afrin abuse in the past. Allergy testing has been negative.The patient had no other ear nose or throat complaints at this visit.  she does have a history of facial discomfort and clenching.

## 2021-05-28 NOTE — ASSESSMENT
[FreeTextEntry1] : It was my impression that she had at least some significant subjective improvement. Her spell testing went from 5to 6 which still keeps her in the lowest percentile, however. As she finds she can now smell each of the 4 test frequencies. I suggested switching to 4 others, but coming back to the original was once a week. She will continue with a Flonase which she says helps nasal congestion and I recommended continuing with the theophylline spray as well and would like to see her back in followup in another 3 months. I would consider stopping the theophylline at that point. She had a trial Ivermectin without results.

## 2021-05-28 NOTE — PHYSICAL EXAM
[FreeTextEntry1] : \par The patient was alert and oriented and in no distress.\par Voice was clear.\par \par Face:\par The patient had no facial asymmetry or mass.\par The skin was unremarkable.\par \par Eyes:\par The pupils were equal round and reactive to light and accommodation.\par There was no significant nystagmus or disconjugate gaze noted.\par \par Nose: \par The external nose had no significant deformity.  There was no facial tenderness.  On anterior rhinoscopy, the nasal mucosa was clear.  The anterior septum was midline.  There were no visualized polyps purulence  or masses.\par \par Oral cavity:\par The oral mucosa was normal.\par The oral and base of tongue were clear and without mass.\par The gingival and buccal mucosa were moist and without lesions.\par The palate moved well.\par There was no cleft to the palate.\par There appeared to be good salivary flow.  \par There was no pus, erythema or mass in the oral cavity.\par \par \par Ears:\par The external ears were normal without deformity.\par The ear canals were clear.\par The tympanic membranes were intact and normal.\par \par Neck: \par The neck was symmetrical.\par The parotid and submandibular glands were normal without masses.\par The trachea was midline and there was no unusual crepitus.\par The thyroid was smooth and nontender and no masses were palpated.\par There was no significant cervical adenopathy.\par \par \par Neuro:\par Neurologically, the patient was awake, alert, and oriented to person, place and time. There were no obvious focal neurologic abnormalities.  Cranial nerves II through XII were grossly intact.\par \par \par TMJ:\par The temporomandibular joints were slightly tender with a half tubes cross bite. [de-identified] : [facility] Testing was done. The patient had 5    out over 12 correct responses which puts the patient in the lowest  percentile for age and sex                                                   Nasal endoscopy: \par \par Endoscopy was done with Covid precautions and with video. All risks and benefits were discussed with the patient and consent obtained.\par \par Procedure Note:\par \par Nasal endoscopy was done with topical anesthesia and a fiberoptic endoscope.\par Indication: Nasal congestion, rule out sinusitis.\par Procedure: The nasal cavity was anesthetized with topical Afrin and Pontocaine. An  endoscope was used and inserted into the nasal cavity. \par Endocoscopy was performed to inspect the interior of the nasal cavity, the nasal septum,  the middle and superior meati, the inferior, middle and superior turbinates, and the spheno-ethmoidal  recesses, the nasopharynx and eustachian tube orifices bilaterally\par  This showed that the nasal mucosa was slightly boggy.  There was a moderate S-shaped septal deflection.  However, the middle meati were open.  There were no polyps, purulence or masses.  The eustachian tube orifices were clear.  The nasopharynx was benign and without mass.  The inferior and middle turbinates were slightly boggy, the superior meati were normal and the sphenoethmoidal recesses were without evidence of disease.\par \par She has a right septal deflection but no obstruction at the level of the cribriform.

## 2021-05-28 NOTE — HISTORY OF PRESENT ILLNESS
[de-identified] : NICOLAS TY Was seen in followup on May 28. She had been using theophylline spray and smell therapy. She notes that she can now smell each of the 4 primary odors.  Other things are less noxious. She has some taste. She also found that yogurt may be helpful.The patient had no other ear nose or throat complaints at this visit.

## 2021-06-04 ENCOUNTER — APPOINTMENT (OUTPATIENT)
Dept: UROGYNECOLOGY | Facility: CLINIC | Age: 31
End: 2021-06-04
Payer: COMMERCIAL

## 2021-06-04 ENCOUNTER — OUTPATIENT (OUTPATIENT)
Dept: OUTPATIENT SERVICES | Facility: HOSPITAL | Age: 31
LOS: 1 days | End: 2021-06-04
Payer: COMMERCIAL

## 2021-06-04 VITALS — DIASTOLIC BLOOD PRESSURE: 77 MMHG | HEART RATE: 90 BPM | SYSTOLIC BLOOD PRESSURE: 119 MMHG | TEMPERATURE: 98.2 F

## 2021-06-04 VITALS
DIASTOLIC BLOOD PRESSURE: 73 MMHG | SYSTOLIC BLOOD PRESSURE: 111 MMHG | TEMPERATURE: 99 F | WEIGHT: 160.06 LBS | RESPIRATION RATE: 18 BRPM | OXYGEN SATURATION: 97 % | HEIGHT: 61 IN | HEART RATE: 84 BPM

## 2021-06-04 DIAGNOSIS — R35.0 FREQUENCY OF MICTURITION: ICD-10-CM

## 2021-06-04 DIAGNOSIS — N89.5 STRICTURE AND ATRESIA OF VAGINA: ICD-10-CM

## 2021-06-04 DIAGNOSIS — N94.2 VAGINISMUS: ICD-10-CM

## 2021-06-04 DIAGNOSIS — N94.19 OTHER SPECIFIED DYSPAREUNIA: ICD-10-CM

## 2021-06-04 DIAGNOSIS — F41.9 ANXIETY DISORDER, UNSPECIFIED: ICD-10-CM

## 2021-06-04 DIAGNOSIS — Z98.890 OTHER SPECIFIED POSTPROCEDURAL STATES: Chronic | ICD-10-CM

## 2021-06-04 DIAGNOSIS — Z01.818 ENCOUNTER FOR OTHER PREPROCEDURAL EXAMINATION: ICD-10-CM

## 2021-06-04 DIAGNOSIS — M62.838 OTHER MUSCLE SPASM: ICD-10-CM

## 2021-06-04 DIAGNOSIS — K52.9 NONINFECTIVE GASTROENTERITIS AND COLITIS, UNSPECIFIED: ICD-10-CM

## 2021-06-04 LAB
ALBUMIN SERPL ELPH-MCNC: 3.4 G/DL — LOW (ref 3.5–5)
ALP SERPL-CCNC: 69 U/L — SIGNIFICANT CHANGE UP (ref 40–120)
ALT FLD-CCNC: 30 U/L DA — SIGNIFICANT CHANGE UP (ref 10–60)
ANION GAP SERPL CALC-SCNC: 7 MMOL/L — SIGNIFICANT CHANGE UP (ref 5–17)
APTT BLD: 32.1 SEC — SIGNIFICANT CHANGE UP (ref 27.5–35.5)
AST SERPL-CCNC: 21 U/L — SIGNIFICANT CHANGE UP (ref 10–40)
BILIRUB SERPL-MCNC: 0.2 MG/DL — SIGNIFICANT CHANGE UP (ref 0.2–1.2)
BLD GP AB SCN SERPL QL: SIGNIFICANT CHANGE UP
BUN SERPL-MCNC: 9 MG/DL — SIGNIFICANT CHANGE UP (ref 7–18)
CALCIUM SERPL-MCNC: 8.9 MG/DL — SIGNIFICANT CHANGE UP (ref 8.4–10.5)
CHLORIDE SERPL-SCNC: 107 MMOL/L — SIGNIFICANT CHANGE UP (ref 96–108)
CO2 SERPL-SCNC: 24 MMOL/L — SIGNIFICANT CHANGE UP (ref 22–31)
CREAT SERPL-MCNC: 0.7 MG/DL — SIGNIFICANT CHANGE UP (ref 0.5–1.3)
GLUCOSE SERPL-MCNC: 104 MG/DL — HIGH (ref 70–99)
HCG SERPL-ACNC: <1 MIU/ML — SIGNIFICANT CHANGE UP
HCT VFR BLD CALC: 43.4 % — SIGNIFICANT CHANGE UP (ref 34.5–45)
HGB BLD-MCNC: 14.3 G/DL — SIGNIFICANT CHANGE UP (ref 11.5–15.5)
INR BLD: 1.04 RATIO — SIGNIFICANT CHANGE UP (ref 0.88–1.16)
MCHC RBC-ENTMCNC: 29.7 PG — SIGNIFICANT CHANGE UP (ref 27–34)
MCHC RBC-ENTMCNC: 32.9 GM/DL — SIGNIFICANT CHANGE UP (ref 32–36)
MCV RBC AUTO: 90 FL — SIGNIFICANT CHANGE UP (ref 80–100)
NRBC # BLD: 0 /100 WBCS — SIGNIFICANT CHANGE UP (ref 0–0)
PLATELET # BLD AUTO: 321 K/UL — SIGNIFICANT CHANGE UP (ref 150–400)
POTASSIUM SERPL-MCNC: 3.5 MMOL/L — SIGNIFICANT CHANGE UP (ref 3.5–5.3)
POTASSIUM SERPL-SCNC: 3.5 MMOL/L — SIGNIFICANT CHANGE UP (ref 3.5–5.3)
PROT SERPL-MCNC: 7.8 G/DL — SIGNIFICANT CHANGE UP (ref 6–8.3)
PROTHROM AB SERPL-ACNC: 12.3 SEC — SIGNIFICANT CHANGE UP (ref 10.6–13.6)
RBC # BLD: 4.82 M/UL — SIGNIFICANT CHANGE UP (ref 3.8–5.2)
RBC # FLD: 12.5 % — SIGNIFICANT CHANGE UP (ref 10.3–14.5)
SODIUM SERPL-SCNC: 138 MMOL/L — SIGNIFICANT CHANGE UP (ref 135–145)
WBC # BLD: 9.3 K/UL — SIGNIFICANT CHANGE UP (ref 3.8–10.5)
WBC # FLD AUTO: 9.3 K/UL — SIGNIFICANT CHANGE UP (ref 3.8–10.5)

## 2021-06-04 PROCEDURE — 99213 OFFICE O/P EST LOW 20 MIN: CPT

## 2021-06-04 PROCEDURE — 86850 RBC ANTIBODY SCREEN: CPT

## 2021-06-04 PROCEDURE — 85730 THROMBOPLASTIN TIME PARTIAL: CPT

## 2021-06-04 PROCEDURE — 84702 CHORIONIC GONADOTROPIN TEST: CPT

## 2021-06-04 PROCEDURE — 36415 COLL VENOUS BLD VENIPUNCTURE: CPT

## 2021-06-04 PROCEDURE — 86901 BLOOD TYPING SEROLOGIC RH(D): CPT

## 2021-06-04 PROCEDURE — 86900 BLOOD TYPING SEROLOGIC ABO: CPT

## 2021-06-04 PROCEDURE — G0463: CPT

## 2021-06-04 PROCEDURE — 99072 ADDL SUPL MATRL&STAF TM PHE: CPT

## 2021-06-04 PROCEDURE — 85027 COMPLETE CBC AUTOMATED: CPT

## 2021-06-04 PROCEDURE — 80053 COMPREHEN METABOLIC PANEL: CPT

## 2021-06-04 PROCEDURE — 85610 PROTHROMBIN TIME: CPT

## 2021-06-04 RX ORDER — BACLOFEN 100 %
1 POWDER (GRAM) MISCELLANEOUS
Qty: 0 | Refills: 0 | DISCHARGE

## 2021-06-04 RX ORDER — MESALAMINE 400 MG
1 TABLET, DELAYED RELEASE (ENTERIC COATED) ORAL
Qty: 0 | Refills: 0 | DISCHARGE

## 2021-06-04 RX ORDER — DIAZEPAM 5 MG
0 TABLET ORAL
Qty: 0 | Refills: 0 | DISCHARGE

## 2021-06-04 RX ORDER — NORETHINDRONE 0.35 MG/1
0 TABLET ORAL
Qty: 0 | Refills: 0 | DISCHARGE

## 2021-06-04 RX ORDER — INFLIXIMAB-DYYB 120 MG/ML
0 INJECTION SUBCUTANEOUS
Qty: 0 | Refills: 0 | DISCHARGE

## 2021-06-04 RX ORDER — HYDROCORTISONE 1 %
1 OINTMENT (GRAM) TOPICAL
Qty: 0 | Refills: 0 | DISCHARGE

## 2021-06-04 NOTE — H&P PST ADULT - NSICDXPROBLEM_GEN_ALL_CORE_FT
PROBLEM DIAGNOSES  Problem: Vaginismus  Assessment and Plan: Vaginal dilation, injection of anesthetic agent into pudendal nerve, trigger point injection of 3 or more muscles, partial hymenectomy on 06/16/2021. Preoperative instructions discussed with pt and given to pt. Instructed pt that she will need someone to escort her home after surgery, to notify security when she arrives in the lobby of the hospital that she is here for surgery, not to eat or drink anything after midnight the night before the surgery, to avoid NSAIDS such as Ibuprofen, motrin, aleve, advil, naproxen before surgery, to take Tylenol if needed for pain, to report if she has been exposed to any one with any contagious diseases including Covid-19 or if she is exhibiting any symptoms of COVID-19, to keep appointment for COVID-19  test 3 days before surgery. Instructed about use of Chlorhexidine 4% soap before surgery. Verbalized understanding of instructions given. .     Problem: Anxiety  Assessment and Plan: Instructed pt to continue Xanax and to  follow-up with provider for management     Problem: Inflammatory bowel diseases (IBD)  Assessment and Plan: Follow-up with provider for management.

## 2021-06-04 NOTE — H&P PST ADULT - NSICDXPASTMEDICALHX_GEN_ALL_CORE_FT
PAST MEDICAL HISTORY:  H/O female dyspareunia     H/O ulcerative colitis     Irritable bowel syndrome (IBS)     Muscle spasm     Other iron deficiency anemia     Stricture and atresia of vagina     Vaginal stenosis     Vaginismus

## 2021-06-04 NOTE — H&P PST ADULT - BMI (KG/M2)
Basal pattern changed todady from High 5 to High 4 to give you less insulin overnight and in the afternoon.    Use your temp basals as needed    Continue to work on entering correct carbohydrates into the pump and entering them every time you eat     Follow up in clinic for diabetes education at least once a year. Upload pump and send [employer] messages sooner if needed.   
30.2

## 2021-06-04 NOTE — H&P PST ADULT - ATTENDING COMMENTS
Patient with vaginismus and pelvic pain here for vaginal dilation under anesthesia, teigger point injection, botox injection of pelvic floor muscles, possible pudendal block.  Risks benefits, adverse outcomes, and off label status of Botox use were reviewed in detail

## 2021-06-04 NOTE — H&P PST ADULT - ASSESSMENT
30 yr old female with PMH of anxiety, IBD-diarrhea, COVID-19 infection in March 2020 presents with vaginismus, dyspareunia, stricture and atresia of vagina, muscle spasm. Pt is scheduled for vaginal dilation, injection of anesthetic agent into pudendal nerve, trigger point injection of 3 or more muscles, partial hymenectomy on 01/22/2020.

## 2021-06-04 NOTE — H&P PST ADULT - PRETERM DELIVERIES, OB PROFILE
Detail Level: Simple
Additional Notes: Patient consent was obtained to proceed with the visit and recommended plan of care after discussion of all risks and benefits, including the risks of COVID-19 exposure.
0

## 2021-06-04 NOTE — H&P PST ADULT - HISTORY OF PRESENT ILLNESS
30 yr old female with PMH of anxiety, IBD-diarrhea, COVID-19 infection in March 2020 presents with c/o pelvic floor dysfunction. Pt reports discomfort with sexual intercourse. Underwent vaginal dilation on 1/22/2020 which provided some relief. Pt is scheduled for vaginal dilation, injection of anesthetic agent into pudendal nerve, trigger point injection of 3 or more muscles, partial hymenectomy on 01/22/2020.

## 2021-06-08 LAB — BACTERIA UR CULT: NORMAL

## 2021-06-15 ENCOUNTER — TRANSCRIPTION ENCOUNTER (OUTPATIENT)
Age: 31
End: 2021-06-15

## 2021-06-16 ENCOUNTER — OUTPATIENT (OUTPATIENT)
Dept: OUTPATIENT SERVICES | Facility: HOSPITAL | Age: 31
LOS: 1 days | End: 2021-06-16
Payer: COMMERCIAL

## 2021-06-16 ENCOUNTER — APPOINTMENT (OUTPATIENT)
Dept: UROGYNECOLOGY | Facility: HOSPITAL | Age: 31
End: 2021-06-16
Payer: COMMERCIAL

## 2021-06-16 VITALS — RESPIRATION RATE: 16 BRPM | HEART RATE: 64 BPM | DIASTOLIC BLOOD PRESSURE: 61 MMHG | SYSTOLIC BLOOD PRESSURE: 108 MMHG

## 2021-06-16 VITALS
DIASTOLIC BLOOD PRESSURE: 73 MMHG | TEMPERATURE: 99 F | HEIGHT: 61 IN | WEIGHT: 160.06 LBS | SYSTOLIC BLOOD PRESSURE: 111 MMHG | RESPIRATION RATE: 18 BRPM | HEART RATE: 84 BPM | OXYGEN SATURATION: 97 %

## 2021-06-16 DIAGNOSIS — Z01.818 ENCOUNTER FOR OTHER PREPROCEDURAL EXAMINATION: ICD-10-CM

## 2021-06-16 DIAGNOSIS — N89.5 STRICTURE AND ATRESIA OF VAGINA: ICD-10-CM

## 2021-06-16 DIAGNOSIS — N94.2 VAGINISMUS: ICD-10-CM

## 2021-06-16 DIAGNOSIS — N94.19 OTHER SPECIFIED DYSPAREUNIA: ICD-10-CM

## 2021-06-16 DIAGNOSIS — M62.838 OTHER MUSCLE SPASM: ICD-10-CM

## 2021-06-16 DIAGNOSIS — Z98.890 OTHER SPECIFIED POSTPROCEDURAL STATES: Chronic | ICD-10-CM

## 2021-06-16 LAB
BLD GP AB SCN SERPL QL: SIGNIFICANT CHANGE UP
HCG UR QL: NEGATIVE — SIGNIFICANT CHANGE UP

## 2021-06-16 PROCEDURE — 86900 BLOOD TYPING SEROLOGIC ABO: CPT

## 2021-06-16 PROCEDURE — 57400 DILATION OF VAGINA: CPT

## 2021-06-16 PROCEDURE — 20553 NJX 1/MLT TRIGGER POINTS 3/>: CPT

## 2021-06-16 PROCEDURE — 86850 RBC ANTIBODY SCREEN: CPT

## 2021-06-16 PROCEDURE — 36415 COLL VENOUS BLD VENIPUNCTURE: CPT

## 2021-06-16 PROCEDURE — 86901 BLOOD TYPING SEROLOGIC RH(D): CPT

## 2021-06-16 PROCEDURE — 81025 URINE PREGNANCY TEST: CPT

## 2021-06-16 RX ORDER — ACETAMINOPHEN 500 MG
1000 TABLET ORAL ONCE
Refills: 0 | Status: DISCONTINUED | OUTPATIENT
Start: 2021-06-16 | End: 2021-06-16

## 2021-06-16 RX ORDER — SODIUM CHLORIDE 9 MG/ML
3 INJECTION INTRAMUSCULAR; INTRAVENOUS; SUBCUTANEOUS EVERY 8 HOURS
Refills: 0 | Status: DISCONTINUED | OUTPATIENT
Start: 2021-06-16 | End: 2021-06-16

## 2021-06-16 RX ORDER — ACETAMINOPHEN 500 MG
2 TABLET ORAL
Qty: 0 | Refills: 0 | DISCHARGE

## 2021-06-16 RX ORDER — INFLIXIMAB-DYYB 120 MG/ML
100 INJECTION SUBCUTANEOUS
Qty: 0 | Refills: 0 | DISCHARGE

## 2021-06-16 RX ORDER — HYDROMORPHONE HYDROCHLORIDE 2 MG/ML
0.5 INJECTION INTRAMUSCULAR; INTRAVENOUS; SUBCUTANEOUS
Refills: 0 | Status: DISCONTINUED | OUTPATIENT
Start: 2021-06-16 | End: 2021-06-16

## 2021-06-16 RX ORDER — OXYCODONE HYDROCHLORIDE 5 MG/1
5 TABLET ORAL
Qty: 50 | Refills: 0
Start: 2021-06-16

## 2021-06-16 RX ORDER — ONDANSETRON 8 MG/1
4 TABLET, FILM COATED ORAL ONCE
Refills: 0 | Status: DISCONTINUED | OUTPATIENT
Start: 2021-06-16 | End: 2021-06-16

## 2021-06-16 RX ORDER — DIAZEPAM 5 MG
1 TABLET ORAL
Qty: 0 | Refills: 0 | DISCHARGE

## 2021-06-16 RX ORDER — OXYCODONE HYDROCHLORIDE 5 MG/1
30 TABLET ORAL EVERY 4 HOURS
Refills: 0 | Status: DISCONTINUED | OUTPATIENT
Start: 2021-06-16 | End: 2021-06-16

## 2021-06-16 RX ORDER — FENTANYL CITRATE 50 UG/ML
25 INJECTION INTRAVENOUS
Refills: 0 | Status: DISCONTINUED | OUTPATIENT
Start: 2021-06-16 | End: 2021-06-16

## 2021-06-16 RX ORDER — OXYCODONE 5 MG/1
5 TABLET ORAL
Qty: 30 | Refills: 0 | Status: ACTIVE | COMMUNITY
Start: 2021-06-16 | End: 1900-01-01

## 2021-06-16 RX ORDER — SODIUM CHLORIDE 9 MG/ML
1000 INJECTION, SOLUTION INTRAVENOUS
Refills: 0 | Status: DISCONTINUED | OUTPATIENT
Start: 2021-06-16 | End: 2021-06-16

## 2021-06-16 NOTE — ASU DISCHARGE PLAN (ADULT/PEDIATRIC) - ASU DC SPECIAL INSTRUCTIONSFT
no sex no tampons.  No heavy lifting,  no pushing,  ambulation daily as tolerated. Shower daily, see your gynecologist in 1-2wks for follow up

## 2021-06-16 NOTE — HISTORY OF PRESENT ILLNESS
[FreeTextEntry1] : Omaira has vaginismus, and has had moderate to good progress after vaginal dilation and Botox in January of 2020. She is falling short of her goals of having pain-free sexual intercourse and to repeat the process.  An examination findings were improved from her initial examination she tolerates a one finger examination however of her muscles are still grossly and spasm. She has bilateral spasm of all muscle groups of the pelvis was banding tenting, and pain on palpation.\par \par A long discussion again counseling regarding risks and benefits of vaginal dilation. Trigger point injection. Possible pudendal block and Botox injection under anesthesia..  It off label application of Botox in this region was reviewed and the risks, benefits addressed, reactions were discussed in detail.  She is previously at the first procedure and again today with read our detail Botox consent and signed. We will forward with the procedure.\par \par \par She has her period at the moment and cannot tolerate a catheter for urine specimen. If her urine is positive she will take nitrofurantoin for 3 days, which was provided for her.\par \par Counseling was greater than 50 percent of encounter which included  26   minute face to face time for direct counseling.\par \par A chaperone was present for the entirety of the physical examination and for the exam room portion of patient interview\par \par \par \par \par

## 2021-06-16 NOTE — ASU DISCHARGE PLAN (ADULT/PEDIATRIC) - CARE PROVIDER_API CALL
Jayden Pak (MD)  Female [facility]; Obstetrics and Gynecology  [address]  [place] [address]  Phone: [phone]  Fax: [phone]  Follow Up Time:

## 2021-06-25 ENCOUNTER — APPOINTMENT (OUTPATIENT)
Dept: UROGYNECOLOGY | Facility: CLINIC | Age: 31
End: 2021-06-25
Payer: COMMERCIAL

## 2021-06-25 VITALS — HEART RATE: 72 BPM | DIASTOLIC BLOOD PRESSURE: 75 MMHG | SYSTOLIC BLOOD PRESSURE: 108 MMHG | TEMPERATURE: 98.1 F

## 2021-06-25 PROCEDURE — 99072 ADDL SUPL MATRL&STAF TM PHE: CPT

## 2021-06-25 PROCEDURE — 99214 OFFICE O/P EST MOD 30 MIN: CPT

## 2021-06-25 NOTE — HISTORY OF PRESENT ILLNESS
[FreeTextEntry1] : this patient is status post vaginal dilation and Botox injection under anesthesia on 616 2021. This was her second procedure of this type. She had excellent improvement after the first procedure but did not get to the endpoint desired of pain-free sexual intercourse. The patient has been dilating since the procedure and until that the large dilator is fitting more easily than it did previously and she is moving towards the extra-large I. Sanjeev dilator at the present time.  She is experimenting with vibrator and encourage her to do this in the increasing fashion to bring out to her comfortable arousal and not just working on dilation. She continued dilation every other day and holding on sexual intercourse for approximately 10 days. With the Coblator having painful intercourse prior to the Botox being optimally on board.\par \par Counseling regarding dilation techniques, bridging techniques were sexual activity, and myofascial release and has done a component of this encounter, which was 31 minutes, face-to-face

## 2021-06-29 PROBLEM — N89.5 STRICTURE AND ATRESIA OF VAGINA: Chronic | Status: ACTIVE | Noted: 2021-06-04

## 2021-06-29 PROBLEM — M62.838 OTHER MUSCLE SPASM: Chronic | Status: ACTIVE | Noted: 2021-06-04

## 2021-06-29 PROBLEM — N94.2 VAGINISMUS: Chronic | Status: ACTIVE | Noted: 2021-06-04

## 2021-07-13 ENCOUNTER — APPOINTMENT (OUTPATIENT)
Dept: UROGYNECOLOGY | Facility: CLINIC | Age: 31
End: 2021-07-13
Payer: COMMERCIAL

## 2021-07-13 PROCEDURE — 99024 POSTOP FOLLOW-UP VISIT: CPT

## 2021-07-13 PROCEDURE — 99213 OFFICE O/P EST LOW 20 MIN: CPT | Mod: 95

## 2021-07-13 NOTE — HISTORY OF PRESENT ILLNESS
[FreeTextEntry1] : This patient has a primary vaginismus, and is status post her second vaginal dilation and Botox procedure on 6-16 -2021\par \par Has had more progress with  using dilators . almost pain free with 11 dilator.  Uses vibrator to massage internal muscles.  Had intercourse 10 min was tolerable but not fully comfortable.  Vaginal valium was helpful for lubrication and penetration. I have recommended trying to work toward next dilator which will allow for just a touch more accommodation.   She agree with this plan is aiming toward the large dilator and partner penetration\par \par \par Counseling was greater than 50 percent of encounter which included  23   minute face to face time for direct counseling.\par \par A chaperone was present for the entirety of the physical examination and for the exam room portion of patient interview\par

## 2021-07-28 ENCOUNTER — RX RENEWAL (OUTPATIENT)
Age: 31
End: 2021-07-28

## 2021-08-18 ENCOUNTER — APPOINTMENT (OUTPATIENT)
Dept: OTOLARYNGOLOGY | Facility: CLINIC | Age: 31
End: 2021-08-18

## 2021-09-01 ENCOUNTER — RX RENEWAL (OUTPATIENT)
Age: 31
End: 2021-09-01

## 2021-09-01 RX ORDER — FLUTICASONE PROPIONATE 50 UG/1
50 SPRAY, METERED NASAL
Qty: 16 | Refills: 0 | Status: ACTIVE | COMMUNITY
Start: 2021-02-05 | End: 1900-01-01

## 2021-10-19 ENCOUNTER — NON-APPOINTMENT (OUTPATIENT)
Age: 31
End: 2021-10-19

## 2021-11-05 ENCOUNTER — APPOINTMENT (OUTPATIENT)
Dept: UROGYNECOLOGY | Facility: CLINIC | Age: 31
End: 2021-11-05
Payer: COMMERCIAL

## 2021-11-05 VITALS
SYSTOLIC BLOOD PRESSURE: 112 MMHG | OXYGEN SATURATION: 98 % | HEIGHT: 61 IN | HEART RATE: 84 BPM | BODY MASS INDEX: 29.27 KG/M2 | WEIGHT: 155 LBS | DIASTOLIC BLOOD PRESSURE: 66 MMHG | TEMPERATURE: 98.3 F

## 2021-11-05 DIAGNOSIS — N94.2 VAGINISMUS: ICD-10-CM

## 2021-11-05 PROCEDURE — 99213 OFFICE O/P EST LOW 20 MIN: CPT

## 2021-11-05 NOTE — HISTORY OF PRESENT ILLNESS
[FreeTextEntry1] : This is a 31-year-old patient with that. This was better and dilation and curettage twice. She's able to have intercourse with full penetration. However. It is still snug and she is some chafing when she is finished. A scissors. This to be marked with success however, she is only enjoying he experienced her very brief period of time and with the distraction of a vibrator.  An examination despite all therapy. She received from our team. She is return of significantly high pelvic floor tone in the posterior fourchette is pulling anteriorly with significant force   had given her the contact information for female pelvic medicine specialist in [place] and she will see the pelvic floor physical therapy for myofascial release, which I think is certainly in order.    I have suggested, when she's having relations. That she controlled the beginning of the sexual activity. The more generous use of imbrication, as well as ensuring lubrication was reapplied and applied to the proximal shaft of the male partner..\par \par As she remains a significantly high pelvic floor tone. The ability to have grossly pain-free sexual intercourse is very satisfying for her.\par \par \par Will follow up in 2-3 months.\par \par Counseling was greater than 50 percent of encounter which included  45   minute face to face time for direct counseling.\par \par A chaperone was present for the entirety of the physical examination and for the exam room portion of patient interview\par

## 2022-08-15 NOTE — HISTORY OF PRESENT ILLNESS
[FreeTextEntry1] : This 29-year-old , has primary vaginismus. She has had an exhaustive history of conventional methods of treatment including psychologic training medications muscle relaxants, physical therapy, vaginal dilators.  She came today for counseling and decision for surgery for vaginal dilation under anesthesia, possible hymen release, possible perineorraphy, and botox injection of the pelvic floor. The risks, benefits, and alternatives have been reviewed, orally, and we reviewed the detail to 5, page Botox consent, which she signed on each page.  I had first reactions were reviewed and the patient had adequate chance for discussion.  She is aware of the limited data and the off label use of Botox in this anatomy.\par Counseling was greater than 50 percent of encounter which included  26   minute face to face time for direct counseling.\par  PAST SURGICAL HISTORY:  No significant past surgical history

## 2023-03-17 NOTE — H&P PST ADULT - NS PRO LAST MENSTRUAL DATE
6/3/2021 Gabapentin Pregnancy And Lactation Text: This medication is Pregnancy Category C and isn't considered safe during pregnancy. It is excreted in breast milk.